# Patient Record
Sex: FEMALE | NOT HISPANIC OR LATINO | URBAN - METROPOLITAN AREA
[De-identification: names, ages, dates, MRNs, and addresses within clinical notes are randomized per-mention and may not be internally consistent; named-entity substitution may affect disease eponyms.]

---

## 2019-07-05 ENCOUNTER — INPATIENT (INPATIENT)
Facility: HOSPITAL | Age: 69
LOS: 1 days | Discharge: HOME | End: 2019-07-07
Attending: HOSPITALIST | Admitting: HOSPITALIST
Payer: COMMERCIAL

## 2019-07-05 VITALS — HEIGHT: 63 IN | WEIGHT: 113.98 LBS

## 2019-07-05 DIAGNOSIS — R00.1 BRADYCARDIA, UNSPECIFIED: ICD-10-CM

## 2019-07-05 DIAGNOSIS — R01.1 CARDIAC MURMUR, UNSPECIFIED: ICD-10-CM

## 2019-07-05 DIAGNOSIS — I10 ESSENTIAL (PRIMARY) HYPERTENSION: ICD-10-CM

## 2019-07-05 DIAGNOSIS — I24.9 ACUTE ISCHEMIC HEART DISEASE, UNSPECIFIED: ICD-10-CM

## 2019-07-05 LAB
ADD ON TEST-SPECIMEN IN LAB: SIGNIFICANT CHANGE UP
ALBUMIN SERPL ELPH-MCNC: 3.6 G/DL — SIGNIFICANT CHANGE UP (ref 3.3–5)
ALP SERPL-CCNC: 56 U/L — SIGNIFICANT CHANGE UP (ref 40–120)
ALT FLD-CCNC: 27 U/L — SIGNIFICANT CHANGE UP (ref 12–78)
ANION GAP SERPL CALC-SCNC: 5 MMOL/L — SIGNIFICANT CHANGE UP (ref 5–17)
APTT BLD: 31.3 SEC — SIGNIFICANT CHANGE UP (ref 27.5–36.3)
AST SERPL-CCNC: 27 U/L — SIGNIFICANT CHANGE UP (ref 15–37)
BASOPHILS # BLD AUTO: 0.06 K/UL — SIGNIFICANT CHANGE UP (ref 0–0.2)
BASOPHILS NFR BLD AUTO: 1 % — SIGNIFICANT CHANGE UP (ref 0–2)
BILIRUB SERPL-MCNC: 0.6 MG/DL — SIGNIFICANT CHANGE UP (ref 0.2–1.2)
BUN SERPL-MCNC: 14 MG/DL — SIGNIFICANT CHANGE UP (ref 7–23)
CALCIUM SERPL-MCNC: 9 MG/DL — SIGNIFICANT CHANGE UP (ref 8.5–10.1)
CHLORIDE SERPL-SCNC: 105 MMOL/L — SIGNIFICANT CHANGE UP (ref 96–108)
CO2 SERPL-SCNC: 30 MMOL/L — SIGNIFICANT CHANGE UP (ref 22–31)
CREAT SERPL-MCNC: 0.76 MG/DL — SIGNIFICANT CHANGE UP (ref 0.5–1.3)
D DIMER BLD IA.RAPID-MCNC: <150 NG/ML DDU — SIGNIFICANT CHANGE UP
EOSINOPHIL # BLD AUTO: 0.24 K/UL — SIGNIFICANT CHANGE UP (ref 0–0.5)
EOSINOPHIL NFR BLD AUTO: 3.8 % — SIGNIFICANT CHANGE UP (ref 0–6)
GLUCOSE SERPL-MCNC: 89 MG/DL — SIGNIFICANT CHANGE UP (ref 70–99)
HCT VFR BLD CALC: 37.3 % — SIGNIFICANT CHANGE UP (ref 34.5–45)
HGB BLD-MCNC: 12.4 G/DL — SIGNIFICANT CHANGE UP (ref 11.5–15.5)
IMM GRANULOCYTES NFR BLD AUTO: 0.3 % — SIGNIFICANT CHANGE UP (ref 0–1.5)
INR BLD: 1.03 RATIO — SIGNIFICANT CHANGE UP (ref 0.88–1.16)
LYMPHOCYTES # BLD AUTO: 2.01 K/UL — SIGNIFICANT CHANGE UP (ref 1–3.3)
LYMPHOCYTES # BLD AUTO: 32 % — SIGNIFICANT CHANGE UP (ref 13–44)
MAGNESIUM SERPL-MCNC: 2 MG/DL — SIGNIFICANT CHANGE UP (ref 1.6–2.6)
MCHC RBC-ENTMCNC: 31.4 PG — SIGNIFICANT CHANGE UP (ref 27–34)
MCHC RBC-ENTMCNC: 33.2 GM/DL — SIGNIFICANT CHANGE UP (ref 32–36)
MCV RBC AUTO: 94.4 FL — SIGNIFICANT CHANGE UP (ref 80–100)
MONOCYTES # BLD AUTO: 0.65 K/UL — SIGNIFICANT CHANGE UP (ref 0–0.9)
MONOCYTES NFR BLD AUTO: 10.3 % — SIGNIFICANT CHANGE UP (ref 2–14)
NEUTROPHILS # BLD AUTO: 3.31 K/UL — SIGNIFICANT CHANGE UP (ref 1.8–7.4)
NEUTROPHILS NFR BLD AUTO: 52.6 % — SIGNIFICANT CHANGE UP (ref 43–77)
NT-PROBNP SERPL-SCNC: 312 PG/ML — HIGH (ref 0–125)
PLATELET # BLD AUTO: 208 K/UL — SIGNIFICANT CHANGE UP (ref 150–400)
POTASSIUM SERPL-MCNC: 3.7 MMOL/L — SIGNIFICANT CHANGE UP (ref 3.5–5.3)
POTASSIUM SERPL-SCNC: 3.7 MMOL/L — SIGNIFICANT CHANGE UP (ref 3.5–5.3)
PROT SERPL-MCNC: 7.5 GM/DL — SIGNIFICANT CHANGE UP (ref 6–8.3)
PROTHROM AB SERPL-ACNC: 11.5 SEC — SIGNIFICANT CHANGE UP (ref 10–12.9)
RBC # BLD: 3.95 M/UL — SIGNIFICANT CHANGE UP (ref 3.8–5.2)
RBC # FLD: 11.9 % — SIGNIFICANT CHANGE UP (ref 10.3–14.5)
SODIUM SERPL-SCNC: 140 MMOL/L — SIGNIFICANT CHANGE UP (ref 135–145)
TROPONIN I SERPL-MCNC: 0.04 NG/ML — SIGNIFICANT CHANGE UP (ref 0.01–0.04)
TROPONIN I SERPL-MCNC: 0.04 NG/ML — SIGNIFICANT CHANGE UP (ref 0.01–0.04)
WBC # BLD: 6.29 K/UL — SIGNIFICANT CHANGE UP (ref 3.8–10.5)
WBC # FLD AUTO: 6.29 K/UL — SIGNIFICANT CHANGE UP (ref 3.8–10.5)

## 2019-07-05 PROCEDURE — 99223 1ST HOSP IP/OBS HIGH 75: CPT

## 2019-07-05 PROCEDURE — 71045 X-RAY EXAM CHEST 1 VIEW: CPT | Mod: 26

## 2019-07-05 PROCEDURE — 93010 ELECTROCARDIOGRAM REPORT: CPT

## 2019-07-05 PROCEDURE — 99285 EMERGENCY DEPT VISIT HI MDM: CPT

## 2019-07-05 RX ORDER — ASPIRIN/CALCIUM CARB/MAGNESIUM 324 MG
324 TABLET ORAL ONCE
Refills: 0 | Status: COMPLETED | OUTPATIENT
Start: 2019-07-05 | End: 2019-07-05

## 2019-07-05 RX ORDER — LOSARTAN POTASSIUM 100 MG/1
50 TABLET, FILM COATED ORAL DAILY
Refills: 0 | Status: DISCONTINUED | OUTPATIENT
Start: 2019-07-05 | End: 2019-07-05

## 2019-07-05 RX ORDER — ENOXAPARIN SODIUM 100 MG/ML
40 INJECTION SUBCUTANEOUS EVERY 24 HOURS
Refills: 0 | Status: DISCONTINUED | OUTPATIENT
Start: 2019-07-05 | End: 2019-07-05

## 2019-07-05 RX ORDER — LOSARTAN POTASSIUM 100 MG/1
75 TABLET, FILM COATED ORAL DAILY
Refills: 0 | Status: DISCONTINUED | OUTPATIENT
Start: 2019-07-05 | End: 2019-07-07

## 2019-07-05 RX ORDER — CLOPIDOGREL BISULFATE 75 MG/1
600 TABLET, FILM COATED ORAL ONCE
Refills: 0 | Status: COMPLETED | OUTPATIENT
Start: 2019-07-05 | End: 2019-07-05

## 2019-07-05 RX ORDER — HEPARIN SODIUM 5000 [USP'U]/ML
4000 INJECTION INTRAVENOUS; SUBCUTANEOUS ONCE
Refills: 0 | Status: COMPLETED | OUTPATIENT
Start: 2019-07-05 | End: 2019-07-05

## 2019-07-05 RX ORDER — ASPIRIN/CALCIUM CARB/MAGNESIUM 324 MG
162 TABLET ORAL ONCE
Refills: 0 | Status: COMPLETED | OUTPATIENT
Start: 2019-07-05 | End: 2019-07-05

## 2019-07-05 RX ORDER — HEPARIN SODIUM 5000 [USP'U]/ML
4000 INJECTION INTRAVENOUS; SUBCUTANEOUS EVERY 6 HOURS
Refills: 0 | Status: DISCONTINUED | OUTPATIENT
Start: 2019-07-05 | End: 2019-07-06

## 2019-07-05 RX ORDER — HEPARIN SODIUM 5000 [USP'U]/ML
2000 INJECTION INTRAVENOUS; SUBCUTANEOUS EVERY 6 HOURS
Refills: 0 | Status: DISCONTINUED | OUTPATIENT
Start: 2019-07-05 | End: 2019-07-06

## 2019-07-05 RX ORDER — NITROGLYCERIN 6.5 MG
0.5 CAPSULE, EXTENDED RELEASE ORAL DAILY
Refills: 0 | Status: COMPLETED | OUTPATIENT
Start: 2019-07-05 | End: 2019-07-07

## 2019-07-05 RX ORDER — ASPIRIN/CALCIUM CARB/MAGNESIUM 324 MG
81 TABLET ORAL DAILY
Refills: 0 | Status: DISCONTINUED | OUTPATIENT
Start: 2019-07-05 | End: 2019-07-07

## 2019-07-05 RX ORDER — HEPARIN SODIUM 5000 [USP'U]/ML
INJECTION INTRAVENOUS; SUBCUTANEOUS
Qty: 25000 | Refills: 0 | Status: DISCONTINUED | OUTPATIENT
Start: 2019-07-05 | End: 2019-07-06

## 2019-07-05 RX ORDER — NITROGLYCERIN 6.5 MG
0.4 CAPSULE, EXTENDED RELEASE ORAL ONCE
Refills: 0 | Status: COMPLETED | OUTPATIENT
Start: 2019-07-05 | End: 2019-07-05

## 2019-07-05 RX ORDER — PANTOPRAZOLE SODIUM 20 MG/1
40 TABLET, DELAYED RELEASE ORAL DAILY
Refills: 0 | Status: DISCONTINUED | OUTPATIENT
Start: 2019-07-05 | End: 2019-07-06

## 2019-07-05 RX ORDER — LOSARTAN/HYDROCHLOROTHIAZIDE 100MG-25MG
1 TABLET ORAL
Qty: 0 | Refills: 0 | DISCHARGE

## 2019-07-05 RX ADMIN — Medication 324 MILLIGRAM(S): at 17:54

## 2019-07-05 RX ADMIN — HEPARIN SODIUM 1000 UNIT(S)/HR: 5000 INJECTION INTRAVENOUS; SUBCUTANEOUS at 22:46

## 2019-07-05 RX ADMIN — CLOPIDOGREL BISULFATE 600 MILLIGRAM(S): 75 TABLET, FILM COATED ORAL at 22:45

## 2019-07-05 RX ADMIN — Medication 0.4 MILLIGRAM(S): at 23:06

## 2019-07-05 RX ADMIN — PANTOPRAZOLE SODIUM 40 MILLIGRAM(S): 20 TABLET, DELAYED RELEASE ORAL at 23:12

## 2019-07-05 RX ADMIN — HEPARIN SODIUM 4000 UNIT(S): 5000 INJECTION INTRAVENOUS; SUBCUTANEOUS at 22:45

## 2019-07-05 RX ADMIN — Medication 0.5 INCH(S): at 23:12

## 2019-07-05 NOTE — ED ADULT TRIAGE NOTE - CHIEF COMPLAINT QUOTE
Pt reports to ED complaining of difficulty breathing and elevated blood pressure. Pt reports to ED complaining of difficulty breathing and elevated blood pressure. O2 sat 96% in triage. Pt with shortness of breath at rest at triage.

## 2019-07-05 NOTE — ED STATDOCS - NS ED ROS FT
Constitutional: No fever or chills  Eyes: No visual changes  HEENT: No throat pain  CV: +chest pain  Resp: no cough +SOB  GI: No abd pain, nausea or vomiting  : No dysuria  MSK: No musculoskeletal pain  Skin: No rash  Neuro: +headache

## 2019-07-05 NOTE — H&P ADULT - ASSESSMENT
Pt is admitted w/    Chest discomfort  SOB  MEDRANO  EKG abnl  Hypertension  - ASA  - telemetry  - repeat troponins and EKGs  - cardiology  consult  - DVT prophylaxis heparin  - IMPROVE VTE Individual Risk Assessment    RISK                                                                Points    [  ] Previous VTE                                                  3    [  ] Thrombophilia                                               2    [  ] Lower limb paralysis                                      2        (unable to hold up >15 seconds)      [  ] Current Cancer                                              2         (within 6 months)    [  ] Immobilization > 24 hrs                                1    [  ] ICU/CCU stay > 24 hours                              1    [  X] Age > 60                                                      1    IMPROVE VTE Score _______1__    IMPROVE Score 0-1: Low Risk, No VTE prophylaxis required for most patients, encourage ambulation.   IMPROVE Score 2-3: At risk, pharmacologic VTE prophylaxis is indicated for most patients (in the absence of a contraindication)  IMPROVE Score > or = 4: High Risk, pharmacologic VTE prophylaxis is indicated for most patients (in the absence of a contraindication) Pt is admitted w/    ACS  Substernal Chest discomfort , tightness 5/10 intermittently  MEDRANO  SOB  Bradycardia ranging  30's to 40's , also 50's   EKG abnl  Hypertension  - discussed case and EKGs  with Dr. Mathew, PCI on call, including EKG changes, he advises to admit to CCU,  cont ASA,  load plavix, heparin,  as pt will likely need an angiogram  - cardiology consult tonight, Dr. Palla   - ASA, load plavix, full dose heparin  - repeat troponins and EKGs  - pt reports her heart rate to be more slow than usual  - echo  - DVT prophylaxis pt on heparin  - IMPROVE VTE Individual Risk Assessment    RISK                                                                Points    [  ] Previous VTE                                                  3    [  ] Thrombophilia                                               2    [  ] Lower limb paralysis                                      2        (unable to hold up >15 seconds)      [  ] Current Cancer                                              2         (within 6 months)    [  ] Immobilization > 24 hrs                                1    [  ] ICU/CCU stay > 24 hours                              1    [  X] Age > 60                                                      1    IMPROVE VTE Score _______1__    IMPROVE Score 0-1: Low Risk, No VTE prophylaxis required for most patients, encourage ambulation.   IMPROVE Score 2-3: At risk, pharmacologic VTE prophylaxis is indicated for most patients (in the absence of a contraindication)  IMPROVE Score > or = 4: High Risk, pharmacologic VTE prophylaxis is indicated for most patients (in the absence of a contraindication)

## 2019-07-05 NOTE — CONSULT NOTE ADULT - PROBLEM SELECTOR RECOMMENDATION 3
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

## 2019-07-05 NOTE — H&P ADULT - HISTORY OF PRESENT ILLNESS
70 y/o female with PMHx of HTN (on losartan and hydrochlorothiazide) presents to the ED c/o SOB. Pt states taking her HTN meds for awhile now, felt her blood pressure was fine and that she didn't need to take her recommended dosages anymore, so she started cutting her pills in half for the past two months. Yesterday, pt noted some chest pain, headache, SOB. Denies lightheadedness, dizziness, nausea. Non smoker, no EtOH. Fhx of cardiac disease: relatives  of heart attacks at old age, both of pt's brothers have cardiac stents. No cardiologist. Pt is a 70 y/o F with PMHx of HTN (on losartan and hydrochlorothiazide), mild bradycardia who presents to the ED c/o increasing SOB and chest pain.  She reports her blood pressure had been stable, she was continuing to exercise and she felt her  HTN meds were too high in dose.  She started cutting her pills in half for the past two months. Last week pt felt SOB , then few days later she had 2/10 substernal chest tightness.   Yesterday, pt walked 5 mi and then had 5/10 chest pain, headache, SOB.    She denies radiation of symptoms.  Pt denies lightheadedness, dizziness, nausea.        Fam hx:    Father CAD,  of Alzheimers complication    Mother DM , may have  of diabetic coma    3 siblings with DM    Brothers have cardiac stents.    Brother in [place] had PE and was later taken anticoagulation after treatment    Surg Hx:   R breast lumpectomy for breast ca , s/p radiation, chemo

## 2019-07-05 NOTE — H&P ADULT - NSHPPHYSICALEXAM_GEN_ALL_CORE
ICU Vital Signs Last 24 Hrs  T(C): 36.8 (05 Jul 2019 16:20), Max: 36.8 (05 Jul 2019 16:20)  T(F): 98.3 (05 Jul 2019 16:20), Max: 98.3 (05 Jul 2019 16:20)  HR: 51 (05 Jul 2019 16:20) (51 - 51)  BP: 141/78 (05 Jul 2019 16:20) (141/78 - 141/78)    RR: 18 (05 Jul 2019 16:20) (18 - 18)  SpO2: 95% (05 Jul 2019 16:20) (95% - 95%)

## 2019-07-05 NOTE — ED ADULT NURSE NOTE - CHIEF COMPLAINT QUOTE
Pt reports to ED complaining of difficulty breathing and elevated blood pressure. O2 sat 96% in triage. Pt with shortness of breath at rest at triage.

## 2019-07-05 NOTE — CONSULT NOTE ADULT - PROBLEM SELECTOR RECOMMENDATION 9
her history and ekg changes suggestive ACS , with episodes of chest pain , negative initial troponin ,   will give statin , antiplatelet agents , losartan ,  no BB as patient has marked bradycardia        patient will need cardiac cath , ccu monitoring

## 2019-07-05 NOTE — H&P ADULT - NSHPSOCIALHISTORY_GEN_ALL_CORE
No tob/ETOH/drug use. Pt lives with her  . 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 No tob/ETOH/drug use. Pt lives with her  .  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

## 2019-07-05 NOTE — ED STATDOCS - CARE PLAN
Principal Discharge DX:	Chest pain, unspecified type  Secondary Diagnosis:	MEDRANO (dyspnea on exertion)

## 2019-07-05 NOTE — H&P ADULT - NSHPLABSRESULTS_GEN_ALL_CORE
16:08 EKG: Sinus bradycardia at 47 bpm, T wave inv in III, avF    20:24 EKG Sinus bradycardia at 43 bpm, biphasic T waves in v2-v3    22:20 EKG Sinus beverley at 41 bpm, T wave inv in III, AVF, v1-v3,  Biphasic T waves in v4-v6    22:21 EKG: Sinus beverley 42 bpm, T wave inv in III, AVF, v1-v3

## 2019-07-05 NOTE — ED STATDOCS - NS_ ATTENDINGSCRIBEDETAILS _ED_A_ED_FT
I, Jamar Casas MD,  performed the initial face to face bedside interview with this patient regarding history of present illness, review of symptoms and relevant past medical, social and family history.  I completed an independent physical examination.  I was the initial provider who evaluated this patient.  The history, relevant review of systems, past medical and surgical history, medical decision making, and physical examination was documented by the scribe in my presence and I attest to the accuracy of the documentation.

## 2019-07-05 NOTE — ED STATDOCS - PHYSICAL EXAMINATION
Constitutional: mild distress AAOx3  Eyes: PERRLA EOMI  Head: Normocephalic atraumatic  Mouth: MMM  Cardiac: regular rate, normal peripheral pulses , no lower extremity swelling   Resp: Lungs CTAB  GI: Abd s/nt/nd  Neuro: CN2-12 intact  Skin: No rashes

## 2019-07-05 NOTE — ED STATDOCS - CLINICAL SUMMARY MEDICAL DECISION MAKING FREE TEXT BOX
70 y/o female with hx of HTN presents to ED with MEDRANO and chest discomfort since yesterday. EKG shows T inversion in leads 3, aVF, v3, v4 with findings concerned for potential inferior ischemia. Heart score is 5-6. Will obtain labs and admit.

## 2019-07-05 NOTE — ED STATDOCS - PROGRESS NOTE DETAILS
68 yo female with a PMH of htn presents with SOB and chest pain since yesterday. SOB worse with exertion and noticed another episode while working out in the gym today. 70 yo female with a PMH of htn presents with SOB and chest pain since yesterday. SOB worse with exertion and noticed another episode while working out in the gym today. Denies abd pain, back pain, n/v/d, f/c/sweating, cough. Discussed results with pt. Labs, including trop was unremarkable. EKG with signs of inferior ischemia. CXR with NAD. Pt to be admitted for further eval. Pt and  at bedside aware and agrees with plan. -Rory Merlos PA-C

## 2019-07-05 NOTE — ED STATDOCS - OBJECTIVE STATEMENT
68 y/o female with PMHx of HTN (on losartan and hydrochlorothiazide) presents to the ED c/o SOB. Pt states taking her HTN meds for awhile now, felt her blood pressure was fine and that she didn't need to take her recommended dosages anymore, so she started cutting her pills in half for the past two months. Yesterday, pt noted some chest pain, headache, SOB. Denies lightheadedness, dizziness, nausea. Non smoker, no EtOH. Fhx of cardiac disease: relatives  of heart attacks at old age, both of pt's brothers have cardiac stents. No cardiologist.

## 2019-07-06 LAB
ADD ON TEST-SPECIMEN IN LAB: SIGNIFICANT CHANGE UP
APPEARANCE UR: CLEAR — SIGNIFICANT CHANGE UP
APTT BLD: 124.3 SEC — CRITICAL HIGH (ref 27.5–36.3)
APTT BLD: > 200 SEC (ref 27.5–36.3)
BACTERIA # UR AUTO: ABNORMAL
BILIRUB UR-MCNC: NEGATIVE — SIGNIFICANT CHANGE UP
CHOLEST SERPL-MCNC: 190 MG/DL — SIGNIFICANT CHANGE UP (ref 10–199)
COLOR SPEC: YELLOW — SIGNIFICANT CHANGE UP
COMMENT - URINE: SIGNIFICANT CHANGE UP
DIFF PNL FLD: ABNORMAL
EPI CELLS # UR: SIGNIFICANT CHANGE UP
GLUCOSE UR QL: NEGATIVE MG/DL — SIGNIFICANT CHANGE UP
HBA1C BLD-MCNC: 5.4 % — SIGNIFICANT CHANGE UP (ref 4–5.6)
HCT VFR BLD CALC: 34.6 % — SIGNIFICANT CHANGE UP (ref 34.5–45)
HCT VFR BLD CALC: 36.7 % — SIGNIFICANT CHANGE UP (ref 34.5–45)
HCV AB S/CO SERPL IA: 0.13 S/CO — SIGNIFICANT CHANGE UP (ref 0–0.99)
HCV AB SERPL-IMP: SIGNIFICANT CHANGE UP
HDLC SERPL-MCNC: 64 MG/DL — SIGNIFICANT CHANGE UP
HGB BLD-MCNC: 11.6 G/DL — SIGNIFICANT CHANGE UP (ref 11.5–15.5)
HGB BLD-MCNC: 12.1 G/DL — SIGNIFICANT CHANGE UP (ref 11.5–15.5)
KETONES UR-MCNC: NEGATIVE — SIGNIFICANT CHANGE UP
LEUKOCYTE ESTERASE UR-ACNC: ABNORMAL
LIPID PNL WITH DIRECT LDL SERPL: 118 MG/DL — HIGH
MCHC RBC-ENTMCNC: 30.9 PG — SIGNIFICANT CHANGE UP (ref 27–34)
MCHC RBC-ENTMCNC: 31.2 PG — SIGNIFICANT CHANGE UP (ref 27–34)
MCHC RBC-ENTMCNC: 33 GM/DL — SIGNIFICANT CHANGE UP (ref 32–36)
MCHC RBC-ENTMCNC: 33.5 GM/DL — SIGNIFICANT CHANGE UP (ref 32–36)
MCV RBC AUTO: 93 FL — SIGNIFICANT CHANGE UP (ref 80–100)
MCV RBC AUTO: 93.6 FL — SIGNIFICANT CHANGE UP (ref 80–100)
NITRITE UR-MCNC: NEGATIVE — SIGNIFICANT CHANGE UP
PH UR: 5 — SIGNIFICANT CHANGE UP (ref 5–8)
PLATELET # BLD AUTO: 182 K/UL — SIGNIFICANT CHANGE UP (ref 150–400)
PLATELET # BLD AUTO: 201 K/UL — SIGNIFICANT CHANGE UP (ref 150–400)
PROT UR-MCNC: NEGATIVE MG/DL — SIGNIFICANT CHANGE UP
RBC # BLD: 3.72 M/UL — LOW (ref 3.8–5.2)
RBC # BLD: 3.92 M/UL — SIGNIFICANT CHANGE UP (ref 3.8–5.2)
RBC # FLD: 11.9 % — SIGNIFICANT CHANGE UP (ref 10.3–14.5)
RBC # FLD: 12.1 % — SIGNIFICANT CHANGE UP (ref 10.3–14.5)
RBC CASTS # UR COMP ASSIST: ABNORMAL /HPF (ref 0–4)
SP GR SPEC: 1.02 — SIGNIFICANT CHANGE UP (ref 1.01–1.02)
TOTAL CHOLESTEROL/HDL RATIO MEASUREMENT: 3 RATIO — LOW (ref 3.3–7.1)
TRIGL SERPL-MCNC: 38 MG/DL — SIGNIFICANT CHANGE UP (ref 10–149)
TROPONIN I SERPL-MCNC: 0.05 NG/ML — HIGH (ref 0.01–0.04)
TSH SERPL-MCNC: 1.41 UU/ML — SIGNIFICANT CHANGE UP (ref 0.34–4.82)
UROBILINOGEN FLD QL: NEGATIVE MG/DL — SIGNIFICANT CHANGE UP
WBC # BLD: 6.57 K/UL — SIGNIFICANT CHANGE UP (ref 3.8–10.5)
WBC # BLD: 6.69 K/UL — SIGNIFICANT CHANGE UP (ref 3.8–10.5)
WBC # FLD AUTO: 6.57 K/UL — SIGNIFICANT CHANGE UP (ref 3.8–10.5)
WBC # FLD AUTO: 6.69 K/UL — SIGNIFICANT CHANGE UP (ref 3.8–10.5)
WBC UR QL: ABNORMAL

## 2019-07-06 PROCEDURE — 93010 ELECTROCARDIOGRAM REPORT: CPT

## 2019-07-06 PROCEDURE — 99233 SBSQ HOSP IP/OBS HIGH 50: CPT

## 2019-07-06 PROCEDURE — 93306 TTE W/DOPPLER COMPLETE: CPT | Mod: 26

## 2019-07-06 RX ORDER — PANTOPRAZOLE SODIUM 20 MG/1
40 TABLET, DELAYED RELEASE ORAL ONCE
Refills: 0 | Status: COMPLETED | OUTPATIENT
Start: 2019-07-06 | End: 2019-07-06

## 2019-07-06 RX ORDER — AMLODIPINE BESYLATE 2.5 MG/1
2.5 TABLET ORAL DAILY
Refills: 0 | Status: DISCONTINUED | OUTPATIENT
Start: 2019-07-06 | End: 2019-07-07

## 2019-07-06 RX ORDER — PANTOPRAZOLE SODIUM 20 MG/1
TABLET, DELAYED RELEASE ORAL
Refills: 0 | Status: DISCONTINUED | OUTPATIENT
Start: 2019-07-06 | End: 2019-07-07

## 2019-07-06 RX ORDER — CLOPIDOGREL BISULFATE 75 MG/1
75 TABLET, FILM COATED ORAL DAILY
Refills: 0 | Status: DISCONTINUED | OUTPATIENT
Start: 2019-07-06 | End: 2019-07-06

## 2019-07-06 RX ORDER — PANTOPRAZOLE SODIUM 20 MG/1
40 TABLET, DELAYED RELEASE ORAL
Refills: 0 | Status: DISCONTINUED | OUTPATIENT
Start: 2019-07-06 | End: 2019-07-07

## 2019-07-06 RX ADMIN — PANTOPRAZOLE SODIUM 40 MILLIGRAM(S): 20 TABLET, DELAYED RELEASE ORAL at 21:25

## 2019-07-06 RX ADMIN — HEPARIN SODIUM 800 UNIT(S)/HR: 5000 INJECTION INTRAVENOUS; SUBCUTANEOUS at 07:27

## 2019-07-06 RX ADMIN — PANTOPRAZOLE SODIUM 40 MILLIGRAM(S): 20 TABLET, DELAYED RELEASE ORAL at 11:58

## 2019-07-06 RX ADMIN — HEPARIN SODIUM 0 UNIT(S)/HR: 5000 INJECTION INTRAVENOUS; SUBCUTANEOUS at 06:25

## 2019-07-06 RX ADMIN — LOSARTAN POTASSIUM 75 MILLIGRAM(S): 100 TABLET, FILM COATED ORAL at 06:24

## 2019-07-06 NOTE — PROGRESS NOTE ADULT - ASSESSMENT
A/P      #Substernal Chest discomfort , tightness 5/10 intermittently  -s/p Cath- no major obstruction - discussed with Dr. Mathew     #Has episode of coffe ground vomitus after cath   -d/c heparin drip, d/c plavix, monitor her cbc   -start iv ppi stat and standing   -if urgent then gi evaluation as an inpatient, if stable outpt evaluation     #Uncontrolled HTN- start small dose of amlodipine and monitor her     #dvt pr- scd for now

## 2019-07-06 NOTE — PROGRESS NOTE ADULT - SUBJECTIVE AND OBJECTIVE BOX
HPI:  Pt is a 70 y/o F with PMHx of HTN (on losartan and hydrochlorothiazide), mild bradycardia who presents to the ED c/o increasing SOB and chest pain.  She reports her blood pressure had been stable, she was continuing to exercise and she felt her  HTN meds were too high in dose.  She started cutting her pills in half for the past two months. Last week pt felt SOB , then few days later she had 2/10 substernal chest tightness.   One day prior to admission pt walked 5 mi and then had 5/10 chest pain, headache, SOB.    She denies radiation of symptoms.  Pt denies lightheadedness, dizziness, nausea.         Review of Systems:  CONSTITUTIONAL: No weakness, fevers or chills  EYES/ENT: No visual changes;  No vertigo or throat pain   NECK: No pain or stiffness  RESPIRATORY: No cough, wheezing, hemoptysis; No shortness of breath,   CARDIOVASCULAR: No chest pain or palpitations  GASTROINTESTINAL: No abdominal or epigastric pain. No nausea, vomiting, or hematemesis; No diarrhea or constipation.   GENITOURINARY: No dysuria, frequency or hematuria  NEUROLOGICAL: No numbness or weakness  SKIN: No itching, burning, rashes, or lesions   All other review of systems is negative unless indicated above    PHYSICAL EXAM:    Vital Signs Last 24 Hrs  T(C): 36.6 (2019 11:52), Max: 36.8 (2019 16:20)  T(F): 97.8 (2019 11:52), Max: 98.3 (2019 16:20)  HR: 63 (2019 11:53) (33 - 63)  BP: 150/64 (2019 11:53) (108/84 - 192/80)  BP(mean): 86 (2019 11:53) (67 - 94)  RR: 19 (2019 11:53) (10 - 20)  SpO2: 99% (2019 11:53) (95% - 100%)    GENERAL: comfortable   HEAD:  Atraumatic, Normocephalic  EYES: EOMI, PERRLA, conjunctiva and sclera clear  HEENT: Moist mucous membranes  NECK: Supple, No JVD  NERVOUS SYSTEM:  Alert & Oriented X3, Motor Strength 5/5 B/L upper and lower extremities; DTRs 2+ intact and symmetric  CHEST/LUNG: Clear to auscultation bilaterally; No rales, rhonchi, wheezing, or rubs  HEART:S1S2 normal, no murmer  ABDOMEN: Soft, Nontender, Nondistended; Bowel sounds present  GENITOURINARY- Voiding, no palpable bladder  EXTREMITIES:  2+ Peripheral Pulses, No clubbing, cyanosis, or edema  MUSCULOSKELTAL- No muscle tenderness, Muscle tone normal, No joint tenderness, no Joint swelling, Joint range of motion-normal  SKIN-no rash, no lesion  PSYCH- Mood stable  LYMPH Node- No palpable lymph node    LABS:                        11.6   6.69  )-----------( 182      ( 2019 04:55 )             34.6     07-05    140  |  105  |  14  ----------------------------<  89  3.7   |  30  |  0.76    Ca    9.0      2019 17:36  Mg     2.0     07-05    TPro  7.5  /  Alb  3.6  /  TBili  0.6  /  DBili  x   /  AST  27  /  ALT  27  /  AlkPhos  56  07-05    PT/INR - ( 2019 17:36 )   PT: 11.5 sec;   INR: 1.03 ratio         PTT - ( 2019 04:55 )  PTT:> 200 sec  Urinalysis Basic - ( 2019 05:00 )    Color: Yellow / Appearance: Clear / S.020 / pH: x  Gluc: x / Ketone: Negative  / Bili: Negative / Urobili: Negative mg/dL   Blood: x / Protein: Negative mg/dL / Nitrite: Negative   Leuk Esterase: Moderate / RBC: 3-5 /HPF / WBC 6-10   Sq Epi: x / Non Sq Epi: Few / Bacteria: Few        CAPILLARY BLOOD GLUCOSE          CARDIAC MARKERS ( 2019 04:55 )  0.052 ng/mL / x     / x     / x     / x      CARDIAC MARKERS ( 2019 20:19 )  0.039 ng/mL / x     / x     / x     / x      CARDIAC MARKERS ( 2019 17:36 )  0.037 ng/mL / x     / x     / x     / x            Standing medicine  aspirin enteric coated 81 milliGRAM(s) Oral daily  clopidogrel Tablet 75 milliGRAM(s) Oral daily  losartan 75 milliGRAM(s) Oral daily  nitroglycerin    2% Ointment 0.5 Inch(s) Transdermal daily  pantoprazole  Injectable      pantoprazole  Injectable 40 milliGRAM(s) IV Push two times a day

## 2019-07-06 NOTE — PROGRESS NOTE ADULT - SUBJECTIVE AND OBJECTIVE BOX
CHIEF COMPLAINT:    HPI:  68 y/o female with PMHx of HTN (on losartan and hydrochlorothiazide) presents to the ED c/o  episodes SOB for few days and chest discomfort for 2 days . Pt states taking her HTN meds for awhile now, felt her blood pressure was fine and that she didn't need to take her recommended dosages anymore, so she started cutting her pills in half for the past two months.  Patient started noticing chest discomfort  started after running , shower after going to work ,, headache, SOB. Denies lightheadedness, dizziness, nausea. Non smoker, no EtOH. Fhx of cardiac disease: relatives  of heart attacks at old age, both of pt's brothers have cardiac stents. No cardiologist. (2019 19:28)    patient does have history of bradycardia , cardiac murmur   19  Patient is feeling ok , does have episodes of  brief chest discomfort ,better with nitro , no sob       PAST MEDICAL & SURGICAL HISTORY:  HTN (hypertension)    breast surgery right lumpectomy   radiation chemotherapy     Allergies    No Known Allergies    Intolerances        SOCIAL HISTORY:  never smoker    FAMILY HISTORY: brothers had CAD      MEDICATIONS  (STANDING):  aspirin enteric coated 81 milliGRAM(s) Oral daily  clopidogrel Tablet 75 milliGRAM(s) Oral daily  heparin  Infusion.  Unit(s)/Hr (10 mL/Hr) IV Continuous <Continuous>  losartan 75 milliGRAM(s) Oral daily  nitroglycerin    2% Ointment 0.5 Inch(s) Transdermal daily  pantoprazole    Tablet 40 milliGRAM(s) Oral daily    MEDICATIONS  (PRN):  heparin  Injectable 4000 Unit(s) IV Push every 6 hours PRN For aPTT less than 40  heparin  Injectable 2000 Unit(s) IV Push every 6 hours PRN For aPTT between 40 - 57      REVIEW OF SYSTEMS:    as above   All other review of systems is negative unless indicated above    Vital Signs Last 24 Hrs  T(C): 36.4 (2019 06:00), Max: 36.8 (2019 16:20)  T(F): 97.6 (2019 06:00), Max: 98.3 (2019 16:20)  HR: 37 (2019 06:00) (33 - 51)  BP: 119/57 (2019 06:00) (108/84 - 192/80)  BP(mean): 75 (2019 06:00) (67 - 91)  RR: 14 (2019 06:00) (10 - 20)  SpO2: 99% (2019 06:00) (95% - 100%)    I&O's Summary      PHYSICAL EXAM:    Constitutional: NAD, awake and alert, well-developed  HEENT: PERR, EOMI,  No oral cyananosis.  Neck:  supple,  No JVD  Respiratory: Breath sounds are clear bilaterally, No wheezing, rales or rhonchi  Cardiovascular: S1 and S2, regular rate and rhythm, ESM  Gastrointestinal: Bowel Sounds present, soft, nontender.   Extremities: No peripheral edema. No clubbing or cyanosis.  Vascular: 2+ peripheral pulses  Neurological: A/O x 3, no focal deficits  Musculoskeletal: no calf tenderness.  Skin: No rashes.      LABS: All Labs Reviewed:                         11.6   6.69  )-----------( 182      ( 2019 04:55 )             34.6     07-05    140  |  105  |  14  ----------------------------<  89  3.7   |  30  |  0.76    Ca    9.0      2019 17:36  Mg     2.0     07-05    TPro  7.5  /  Alb  3.6  /  TBili  0.6  /  DBili  x   /  AST  27  /  ALT  27  /  AlkPhos  56  07-05    CARDIAC MARKERS ( 2019 04:55 )  0.052 ng/mL / x     / x     / x     / x      CARDIAC MARKERS ( 2019 20:19 )  0.039 ng/mL / x     / x     / x     / x      CARDIAC MARKERS ( 2019 17:36 )  0.037 ng/mL / x     / x     / x     / x          LIVER FUNCTIONS - ( 2019 17:36 )  Alb: 3.6 g/dL / Pro: 7.5 gm/dL / ALK PHOS: 56 U/L / ALT: 27 U/L / AST: 27 U/L / GGT: x           PT/INR - ( 2019 17:36 )   PT: 11.5 sec;   INR: 1.03 ratio         PTT - ( 2019 04:55 )  PTT:> 200 sec  Urinalysis Basic - ( 2019 05:00 )    Color: Yellow / Appearance: Clear / S.020 / pH: x  Gluc: x / Ketone: Negative  / Bili: Negative / Urobili: Negative mg/dL   Blood: x / Protein: Negative mg/dL / Nitrite: Negative   Leuk Esterase: Moderate / RBC: 3-5 /HPF / WBC 6-10   Sq Epi: x / Non Sq Epi: Few / Bacteria: Few            RADIOLOGY/EK19  4:08 pm,   sinus  bradycardia ICRBB mild  T inversion  inferior anterolateral leads III,AVF v3 to v6     19  10:21 SB  inferior anterior  T wave inversion more pronounced than prior ,       19  sinus bradycardia ICRBBB  some what improved T inversion

## 2019-07-06 NOTE — PROGRESS NOTE ADULT - PROBLEM SELECTOR PLAN 1
her history and dynamic T wave ekg changes suggestive ACS , with episodes of chest pain , negative troponin ,   will give statin , antiplatelet agents , losartan ,    patient is scheduled to go for cardiac cath at 10 am ,

## 2019-07-06 NOTE — PROGRESS NOTE ADULT - PROBLEM SELECTOR PLAN 3
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

## 2019-07-07 ENCOUNTER — TRANSCRIPTION ENCOUNTER (OUTPATIENT)
Age: 69
End: 2019-07-07

## 2019-07-07 VITALS — TEMPERATURE: 97 F

## 2019-07-07 LAB
HCT VFR BLD CALC: 38.1 % — SIGNIFICANT CHANGE UP (ref 34.5–45)
HGB BLD-MCNC: 12.5 G/DL — SIGNIFICANT CHANGE UP (ref 11.5–15.5)
MCHC RBC-ENTMCNC: 30.6 PG — SIGNIFICANT CHANGE UP (ref 27–34)
MCHC RBC-ENTMCNC: 32.8 GM/DL — SIGNIFICANT CHANGE UP (ref 32–36)
MCV RBC AUTO: 93.2 FL — SIGNIFICANT CHANGE UP (ref 80–100)
PLATELET # BLD AUTO: 187 K/UL — SIGNIFICANT CHANGE UP (ref 150–400)
RBC # BLD: 4.09 M/UL — SIGNIFICANT CHANGE UP (ref 3.8–5.2)
RBC # FLD: 12 % — SIGNIFICANT CHANGE UP (ref 10.3–14.5)
WBC # BLD: 6.08 K/UL — SIGNIFICANT CHANGE UP (ref 3.8–10.5)
WBC # FLD AUTO: 6.08 K/UL — SIGNIFICANT CHANGE UP (ref 3.8–10.5)

## 2019-07-07 PROCEDURE — 93010 ELECTROCARDIOGRAM REPORT: CPT

## 2019-07-07 RX ORDER — ATORVASTATIN CALCIUM 80 MG/1
1 TABLET, FILM COATED ORAL
Qty: 30 | Refills: 0
Start: 2019-07-07 | End: 2019-08-05

## 2019-07-07 RX ORDER — PANTOPRAZOLE SODIUM 20 MG/1
1 TABLET, DELAYED RELEASE ORAL
Qty: 30 | Refills: 0
Start: 2019-07-07 | End: 2019-08-05

## 2019-07-07 RX ORDER — LOSARTAN/HYDROCHLOROTHIAZIDE 100MG-25MG
0.5 TABLET ORAL
Qty: 0 | Refills: 0 | DISCHARGE

## 2019-07-07 RX ORDER — LOSARTAN POTASSIUM 100 MG/1
3 TABLET, FILM COATED ORAL
Qty: 90 | Refills: 0
Start: 2019-07-07 | End: 2019-08-05

## 2019-07-07 RX ADMIN — PANTOPRAZOLE SODIUM 40 MILLIGRAM(S): 20 TABLET, DELAYED RELEASE ORAL at 06:07

## 2019-07-07 NOTE — DISCHARGE NOTE PROVIDER - NSDCCPCAREPLAN_GEN_ALL_CORE_FT
PRINCIPAL DISCHARGE DIAGNOSIS  Diagnosis: Chest pain, unspecified type  Assessment and Plan of Treatment:       SECONDARY DISCHARGE DIAGNOSES  Diagnosis: MEDRANO (dyspnea on exertion)  Assessment and Plan of Treatment:

## 2019-07-07 NOTE — CONSULT NOTE ADULT - SUBJECTIVE AND OBJECTIVE BOX
CHIEF COMPLAINT:    HPI:  68 y/o female with PMHx of HTN (on losartan and hydrochlorothiazide) presents to the ED c/o  episodes SOB for few days and chest discomfort for 2 days . Pt states taking her HTN meds for awhile now, felt her blood pressure was fine and that she didn't need to take her recommended dosages anymore, so she started cutting her pills in half for the past two months.  Patient started noticing chest discomfort  started after running , shower after going to work ,, headache, SOB. Denies lightheadedness, dizziness, nausea. Non smoker, no EtOH. Fhx of cardiac disease: relatives  of heart attacks at old age, both of pt's brothers have cardiac stents. No cardiologist. (2019 19:28)    patient does have history of bradycardia , cardiac murmur       PAST MEDICAL & SURGICAL HISTORY:  HTN (hypertension)    breast surgery right lumpectomy   radiation chemotherapy     Allergies    No Known Allergies    Intolerances        SOCIAL HISTORY:  never smoker    FAMILY HISTORY: brothers had CAD      MEDICATIONS:  MEDICATIONS  (STANDING):  aspirin  chewable 162 milliGRAM(s) Oral once  aspirin enteric coated 81 milliGRAM(s) Oral daily  clopidogrel Tablet 600 milliGRAM(s) Oral once  heparin  Infusion.  Unit(s)/Hr (10 mL/Hr) IV Continuous <Continuous>  heparin  Injectable 4000 Unit(s) IV Push once  losartan 50 milliGRAM(s) Oral daily    MEDICATIONS  (PRN):  heparin  Injectable 4000 Unit(s) IV Push every 6 hours PRN For aPTT less than 40  heparin  Injectable 2000 Unit(s) IV Push every 6 hours PRN For aPTT between 40 - 57      REVIEW OF SYSTEMS:    CONSTITUTIONAL: No weakness, fevers or chills  EYES/ENT: No visual changes;  No vertigo or throat pain   NECK: No pain or stiffness  RESPIRATORY: No cough, wheezing, hemoptysis; No shortness of breath  CARDIOVASCULAR: No chest pain or palpitations  GASTROINTESTINAL: No abdominal or epigastric pain. No nausea, vomiting, or hematemesis; No diarrhea or constipation. No melena or hematochezia.  GENITOURINARY: No dysuria, frequency or hematuria  NEUROLOGICAL: No numbness or weakness  SKIN: No itching, burning, rashes, or lesions   All other review of systems is negative unless indicated above    Vital Signs Last 24 Hrs  T(C): 36.2 (2019 21:17), Max: 36.8 (2019 16:20)  T(F): 97.2 (2019 21:17), Max: 98.3 (2019 16:20)  HR: 47 (2019 21:17) (36 - 51)  BP: 183/65 (2019 21:17) (141/78 - 192/80)  BP(mean): --  RR: 18 (2019 21:17) (18 - 18)  SpO2: 98% (2019 21:17) (95% - 100%)    I&O's Summary      PHYSICAL EXAM:    Constitutional: NAD, awake and alert, well-developed  HEENT: PERR, EOMI,  No oral cyananosis.  Neck:  supple,  No JVD  Respiratory: Breath sounds are clear bilaterally, No wheezing, rales or rhonchi  Cardiovascular: S1 and S2, regular rate and rhythm, ESM  Gastrointestinal: Bowel Sounds present, soft, nontender.   Extremities: No peripheral edema. No clubbing or cyanosis.  Vascular: 2+ peripheral pulses  Neurological: A/O x 3, no focal deficits  Musculoskeletal: no calf tenderness.  Skin: No rashes.      LABS: All Labs Reviewed:                        12.4   6.29  )-----------( 208      ( 2019 17:36 )             37.3     2019 17:36    140    |  105    |  14     ----------------------------<  89     3.7     |  30     |  0.76     Ca    9.0        2019 17:36  Mg     2.0       2019 17:36    TPro  7.5    /  Alb  3.6    /  TBili  0.6    /  DBili  x      /  AST  27     /  ALT  27     /  AlkPhos  56     2019 17:36    PT/INR - ( 2019 17:36 )   PT: 11.5 sec;   INR: 1.03 ratio         PTT - ( 2019 17:36 )  PTT:31.3 sec  CARDIAC MARKERS ( 2019 20:19 )  0.039 ng/mL / x     / x     / x     / x      CARDIAC MARKERS ( 2019 17:36 )  0.037 ng/mL / x     / x     / x     / x          Blood Culture:    @ 17:36  Pro Bnp 312        RADIOLOGY/EK19  4:08 pm,   sinus  bradycardia ICRBB mild  T inversion  inferior anterolateral leads III,AVF v3 to v6     19  10:21 SB  inferior anterior  T wave inversion more pronounced than prior ,
CARDIOLOGY CONSULTATION SECOND OPINION    Patient is a 69y old  Female who presents with a chief complaint of cpain  SOB, MEDRANO  Hypertension     HPI:  Pt is a 68 y/o F with PMHx of HTN (on losartan and hydrochlorothiazide), mild bradycardia who presents to the ED c/o increasing SOB and chest pain.  She reports her blood pressure had been stable, she was continuing to exercise and she felt her  HTN meds were too high in dose.  She started cutting her pills in half for the past two months. Last week pt felt SOB , then few days later she had 2/10 substernal chest tightness.   Day before admission pt walked 5 mi and then had 5/10 chest pain, headache, SOB.    She denies radiation of symptoms.  Pt denies lightheadedness, dizziness, nausea.        Fam hx:    Father CAD,  of Alzheimer complication    Mother DM , may have  of diabetic coma    3 siblings with DM    Brothers have cardiac stents.    Brother in [place] had PE and was later taken anticoagulation after treatment    Surg Hx:   R breast lumpectomy for breast ca , s/p radiation, chemo     PAST MEDICAL & SURGICAL HISTORY:  HTN (hypertension)  Essential hypertension  No significant past surgical history      MEDICATIONS  (STANDING):  amLODIPine   Tablet 2.5 milliGRAM(s) Oral daily  aspirin enteric coated 81 milliGRAM(s) Oral daily  losartan 75 milliGRAM(s) Oral daily  nitroglycerin    2% Ointment 0.5 Inch(s) Transdermal daily  pantoprazole  Injectable      pantoprazole  Injectable 40 milliGRAM(s) IV Push two times a day    MEDICATIONS  (PRN):          SOCIAL HISTORY: non smoker, no alcohol use     REVIEW OF SYSTEMS:  CONSTITUTIONAL:    No fatigue, malaise, lethargy.  No fever or chills.  HEENT:  Eyes:  No visual changes.     ENT:  No epistaxis.  No sinus pain.    RESPIRATORY:  No cough.  No wheeze.  No hemoptysis.  No shortness of breath.  CARDIOVASCULAR:  c/o chest pains.  No palpitations. c/o shortness of breath, No orthopnea or PND.  GASTROINTESTINAL:  No abdominal pain.  No nausea or vomiting.    GENITOURINARY:    No hematuria.    MUSCULOSKELETAL:  No musculoskeletal pain.  No joint swelling.  No arthritis.  NEUROLOGICAL:  No tingling or numbness or weakness.  PSYCHIATRIC:  No confusion  SKIN:  No rashes.    ENDOCRINE:  No unexplained weight loss.  No polydipsia.   HEMATOLOGIC:  No anemia.  No prolonged or excessive bleeding.   ALLERGIC AND IMMUNOLOGIC:  No pruritus.          Vital Signs Last 24 Hrs  T(C): 36.7 (2019 04:00), Max: 36.8 (2019 16:29)  T(F): 98 (2019 04:00), Max: 98.2 (2019 16:29)  HR: 34 (2019 06:00) (34 - 63)  BP: 113/51 (2019 06:00) (96/42 - 157/69)  BP(mean): 67 (2019 06:00) (56 - 94)  RR: 17 (2019 21:00) (15 - 20)  SpO2: 100% (2019 06:00) (98% - 100%)    PHYSICAL EXAM-    Constitutional: frail female in no acute distress    Head: Head is normocephalic and atraumatic.      Neck: No jugular venous distention. No audible carotid bruits. There are strong carotid pulses bilaterally. No JVD.     Cardiovascular: Regular rate and rhythm without S3, S4. No murmurs or rubs are appreciated.      Respiratory: Breathsounds are normal. No rales. No wheezing.    Abdomen: Soft, nontender, nondistended with positive bowel sounds.      Extremity: No tenderness. No  pitting edema     Neurologic: The patient is alert and oriented.      Skin: No rash, no obvious lesions noted.      Psychiatric: The patient appears to be emotionally stable.      INTERPRETATION OF TELEMETRY: sinus bradycardia as low as 35/min     ECG: Sinus bradycardia     I&O's Detail      LABS:                        12.5   6.08  )-----------( 187      ( 2019 06:59 )             38.1     07-05    140  |  105  |  14  ----------------------------<  89  3.7   |  30  |  0.76    Ca    9.0      2019 17:36  Mg     2.0     07-05    TPro  7.5  /  Alb  3.6  /  TBili  0.6  /  DBili  x   /  AST  27  /  ALT  27  /  AlkPhos  56  07-05    CARDIAC MARKERS ( 2019 04:55 )  0.052 ng/mL / x     / x     / x     / x      CARDIAC MARKERS ( 2019 20:19 )  0.039 ng/mL / x     / x     / x     / x      CARDIAC MARKERS ( 2019 17:36 )  0.037 ng/mL / x     / x     / x     / x          PT/INR - ( 2019 17:36 )   PT: 11.5 sec;   INR: 1.03 ratio         PTT - ( 2019 13:08 )  PTT:124.3 sec    I&O's Summary    BNP  RADIOLOGY & ADDITIONAL STUDIES:  < from: Cardiac Cath Lab - Adult (19 @ 10:36) >     EXAM:  CARDIAC CATHERIZATION         PROCEDURE DATE:  2019        INTERPRETATION:  Cardiac Catheterization Report     Demographics     Patient Name  REYES       Height         63 Inches                 [facility]       [id number]      Weight         113.98 Pounds   Record Number     Account       [id number]        BSA            1.52 m^2   Number     Date of Birth 1950     BMI            20.19 kg/m^2     Age           69 year(s)     Performing     Liberty Mathew MD, Director                                Physician      of Cardiac Cath Lab     Gender        Female         Referring      Palla Venugopal MD                                Physician    Procedure Start Time: 2019 10:28.    Procedure  Procedure Type:  Coronary Angiography and Left Heart Cath With Ventriculogram .    Hemostasis with Hemoband .    Admission Data        -The patient shows CHF symptoms of MEDRANO.      -The patient's anginal syndrome was assessed as Unstable angina.     Medical History     Risk Factors     The patient risk factors include:hypertension and family history of   premature CAD appeared at age 52.    Procedure Data    Continuous Physiologic monitoring was performed pre, jesus, and post  procedure.    Diagnostic Cath Status: Urgent    Entry Locations    - Retrograde Percutaneous access was performed through the Right Radial      artery. A 6 Fr sheath was inserted. Hemostasis was successfully   obtained      using General.      Comments: VascBand.    Procedure Medications:    - Versed I.V. 1 mg.      - Fentanyl I.V. 25 mcg.      - Verapamil I.A. 2.5 mg.      - Heparin I.A. 2000 units.    Diagnostic Catheters    - 5FR FL 3.5 DIAGNOSTIC was used for Left coronary angiography.      - 5FR FR 4 DIAGNOSTIC was used for Right coronary angiography.      - 5FR STR PIG DIAGNOSTIC was used for Left ventriculography.    Contrast Material:    - Qnklznshr24 ml    Fluoroscopy Time:Diagnostic: 3:08 minutes. Total: 3:08 minutes.    Fluoroscopy Dose:Diagnostic: 223 mGy. Total: 223mGy.    Estimated Blood Loss:6 ml     Hemodynamics     Condition: Rest    Pressure  +-----+--------------------------------------------------------------------  +  !Site !Pressure                                                              !  +-----+--------------------------------------------------------------------  +  !AO   !121/55 (79)                                                           !  +-----+--------------------------------------------------------------------  +  !LV   !153/4 ,20                                                           !  +-----+--------------------------------------------------------------------  +     Condition: Post LV    Pressure  +-----+--------------------------------------------------------------------  +  !Site !Pressure                                                              !  +-----+--------------------------------------------------------------------  +  !AO   !156/60 (99)                                                           !  +-----+--------------------------------------------------------------------  +  !LV   !153/4 ,20                                                             !  +-----+--------------------------------------------------------------------  +     AngiographicFindings     Cardiac Arteries and Lesion Findings    LMCA: Large caliber vessel. Normal.    LAD: Large caliber vessel. There is moderate diffuse disease noted. There   is  a 40% stenosis noted in the proximal portion of the vessel.    LCx: Large caliber vessel. There is mild diffuse disease noted.    RCA: Small caliber vessel. Normal.    VA  LV function assessed as:Normal.  Ejection Fraction  +----------------------------------------------------------------------+---  +  !Method                                               !EF%!  +----------------------------------------------------------------------+---  +  !LV gram                                                                 !60 !  +----------------------------------------------------------------------+---  +     Impression     Diagnostic Conclusions     Normal LV systolic function. Estimated LV ejection fraction is 60 %.   Two Vessel coronary artery disease (LAD& lCX) .   Non-Obstructive CAD.   Manage with medical therapy.   Elevated left ventricular end-diastolic pressure.     Recommendations     Aggressive medical management of coronary artery disease and its   underlying risk factors.   Manage with medical therapy.   DVT scan    Estimated Blood Loss:6ml.     Signatures     ----------------------------------------------------------------   Electronically signed by Liberty Mathew MD, Director of   Cardiac Cath Lab(Performing Physician) on 2019 11:07   ----------------------------------------------------------------    < end of copied text >

## 2019-07-07 NOTE — DISCHARGE NOTE PROVIDER - CARE PROVIDER_API CALL
Valeria Anthony)  Cardiovascular Disease; Internal Medicine  [address] [place] [address]  Phone: [phone]  Fax: [phone]  Follow Up Time: 1-3 days

## 2019-07-07 NOTE — DISCHARGE NOTE NURSING/CASE MANAGEMENT/SOCIAL WORK - NSDCDPATPORTLINK_GEN_ALL_CORE
You can access the [facility] Patient Portal, offered by [facility] [place], by registering with the following website: [email or web address]

## 2019-07-07 NOTE — DISCHARGE NOTE PROVIDER - HOSPITAL COURSE
HPI:    Pt is a 68 y/o F with PMHx of HTN (on losartan and hydrochlorothiazide), mild bradycardia who presents to the ED c/o increasing SOB and chest pain.  She reports her blood pressure had been stable, she was continuing to exercise and she felt her  HTN meds were too high in dose.  She started cutting her pills in half for the past two months. Last week pt felt SOB , then few days later she had 2/10 substernal chest tightness.   Yesterday, pt walked 5 mi and then had 5/10 chest pain, headache, SOB.    She denies radiation of symptoms.  Pt denies lightheadedness, dizziness, nausea.               Review of Systems:    CONSTITUTIONAL: No weakness, fevers or chills    EYES/ENT: No visual changes;  No vertigo or throat pain     NECK: No pain or stiffness    RESPIRATORY: No cough, wheezing, hemoptysis; No shortness of breath,     CARDIOVASCULAR: No chest pain or palpitations    GASTROINTESTINAL: No abdominal or epigastric pain. No nausea, vomiting, or hematemesis; No diarrhea or constipation.     GENITOURINARY: No dysuria, frequency or hematuria    NEUROLOGICAL: No numbness or weakness    SKIN: No itching, burning, rashes, or lesions     All other review of systems is negative unless indicated above        PHYSICAL EXAM:        Vital Signs Last 24 Hrs    T(C): 36.7 (07 Jul 2019 04:00), Max: 36.8 (06 Jul 2019 16:29)    T(F): 98 (07 Jul 2019 04:00), Max: 98.2 (06 Jul 2019 16:29)    HR: 48 (07 Jul 2019 09:00) (34 - 63)    BP: 154/57 (07 Jul 2019 08:00) (96/42 - 154/57)    BP(mean): 83 (07 Jul 2019 08:00) (56 - 88)    RR: 17 (06 Jul 2019 21:00) (15 - 20)    SpO2: 84% (07 Jul 2019 09:00) (84% - 100%)        GENERAL: comfortable     HEAD:  Atraumatic, Normocephalic    EYES: EOMI, PERRLA, conjunctiva and sclera clear    HEENT: Moist mucous membranes    NECK: Supple, No JVD    NERVOUS SYSTEM:  Alert & Oriented X3, Motor Strength 5/5 B/L upper and lower extremities; DTRs 2+ intact and symmetric    CHEST/LUNG: Clear to auscultation bilaterally; No rales, rhonchi, wheezing, or rubs    HEART:S1S2 normal, no murmer    ABDOMEN: Soft, Nontender, Nondistended; Bowel sounds present    GENITOURINARY- Voiding, no palpable bladder    EXTREMITIES:  2+ Peripheral Pulses, No clubbing, cyanosis, or edema    MUSCULOSKELTAL- No muscle tenderness, Muscle tone normal, No joint tenderness, no Joint swelling, Joint range of motion-normal    SKIN-no rash, no lesion    PSYCH- Mood stable    LYMPH Node- No palpable lymph node        LABS:                            12.5     6.08  )-----------( 187      ( 07 Jul 2019 06:59 )               38.1         A/P        #Shortness of breath -probably from uncontrolled htn causing possible episodic acute decompensated diastolic heart failure     -currently pt is feeling better and no shortness of breath     -she underwent cardiac cath -did not show any acute pathology     -plan is to d/c her with further management as an outpt. Discussed with Dr. Andrade , she will see her on monday for adjustment of her bp meds         #Yesterday one episode of coffe ground vomitus- probably from antiplatelet agent/heparin- no further episode. advised to have outpt GI evaluation        #D/C today, time spent 40 minutes

## 2019-07-07 NOTE — CONSULT NOTE ADULT - ASSESSMENT
Chest pain and SOB- likely secondary to poorly controlled HTN  She was concerned about diuresis with HCTZ.  For now with bradycardia - will hold off amlodipine and continue losartan at 75mg po daily.  Discussed with RN.  Nonobsturctive CAD.  Lipid panel results noted.  Low dose statin - lipitor 10mg HS.   Radial acess site 2 + radial pulse.    Coffee ground emesis after LHC yesterday- likely from all the antiplatelet and antithrombotic agents.  No further episodes.  f/u with GI as outpt.  Will hold off ASA for now and will restart as discharge.    HTN- med changes as above,  AMbulatory BP monitor as outpt and further adjustments as outpt.  f/u with me after discharge early next week     Bradycardia- likely from high vagal tone.  She will followup with EP team as outpt.   Ambulate pt to day and see increase in HR prior to DC for chronotropic competence.     Pt is advised to seek medical attention right away if his symptoms recur or develops new symptoms and patient expressed full understanding of this.     Other medical issues- Management per primary team.   Thank you for allowing me to participate in the care of this patient. Please feel free to contact me with any questions.

## 2019-07-11 DIAGNOSIS — Z85.3 PERSONAL HISTORY OF MALIGNANT NEOPLASM OF BREAST: ICD-10-CM

## 2019-07-11 DIAGNOSIS — I11.0 HYPERTENSIVE HEART DISEASE WITH HEART FAILURE: ICD-10-CM

## 2019-07-11 DIAGNOSIS — R00.1 BRADYCARDIA, UNSPECIFIED: ICD-10-CM

## 2019-07-11 DIAGNOSIS — I50.33 ACUTE ON CHRONIC DIASTOLIC (CONGESTIVE) HEART FAILURE: ICD-10-CM

## 2019-07-11 DIAGNOSIS — R01.1 CARDIAC MURMUR, UNSPECIFIED: ICD-10-CM

## 2019-07-11 DIAGNOSIS — R94.31 ABNORMAL ELECTROCARDIOGRAM [ECG] [EKG]: ICD-10-CM

## 2019-07-11 DIAGNOSIS — I25.110 ATHEROSCLEROTIC HEART DISEASE OF NATIVE CORONARY ARTERY WITH UNSTABLE ANGINA PECTORIS: ICD-10-CM

## 2019-08-01 NOTE — ED ADULT NURSE NOTE - CHIEF COMPLAINT
Dr. Ma at bedside, answering pt's final questions and concerns.   
Pt updated with POC.  
The patient is a 69y Female complaining of difficulty breathing.

## 2019-12-09 NOTE — ED STATDOCS - ATTENDING CONTRIBUTION TO CARE
PROVIDER:[TOKEN:[[id number]],FOLLOWUP:[1-3 Days]] I evaluated the patient in the intake section of the emergency department. The initial assessment was performed by myself and then the patient was handed off to the ACP. The patient was followed and re-evaluated by the ACP. All labs, imaging and procedures were evaluated and performed by the ACP and I was available for consultation if any questions in the patients care came up.

## 2023-08-15 PROBLEM — I10 ESSENTIAL (PRIMARY) HYPERTENSION: Chronic | Status: ACTIVE | Noted: 2019-07-05

## 2023-08-29 PROBLEM — Z00.00 ENCOUNTER FOR PREVENTIVE HEALTH EXAMINATION: Status: ACTIVE | Noted: 2023-08-29

## 2023-08-30 ENCOUNTER — APPOINTMENT (OUTPATIENT)
Dept: ORTHOPEDIC SURGERY | Facility: CLINIC | Age: 73
End: 2023-08-30

## 2023-09-06 ENCOUNTER — APPOINTMENT (OUTPATIENT)
Dept: ORTHOPEDIC SURGERY | Facility: CLINIC | Age: 73
End: 2023-09-06

## 2024-04-14 ENCOUNTER — NON-APPOINTMENT (OUTPATIENT)
Age: 74
End: 2024-04-14

## 2024-07-20 ENCOUNTER — NON-APPOINTMENT (OUTPATIENT)
Age: 74
End: 2024-07-20